# Patient Record
Sex: FEMALE | URBAN - METROPOLITAN AREA
[De-identification: names, ages, dates, MRNs, and addresses within clinical notes are randomized per-mention and may not be internally consistent; named-entity substitution may affect disease eponyms.]

---

## 2018-11-12 ENCOUNTER — APPOINTMENT (RX ONLY)
Dept: URBAN - METROPOLITAN AREA OTHER 10 | Facility: OTHER | Age: 48
Setting detail: DERMATOLOGY
End: 2018-11-12

## 2018-11-12 DIAGNOSIS — L20.89 OTHER ATOPIC DERMATITIS: ICD-10-CM

## 2018-11-12 DIAGNOSIS — B35.1 TINEA UNGUIUM: ICD-10-CM

## 2018-11-12 PROBLEM — L85.3 XEROSIS CUTIS: Status: ACTIVE | Noted: 2018-11-12

## 2018-11-12 PROBLEM — D23.61 OTHER BENIGN NEOPLASM OF SKIN OF RIGHT UPPER LIMB, INCLUDING SHOULDER: Status: ACTIVE | Noted: 2018-11-12

## 2018-11-12 PROBLEM — L20.84 INTRINSIC (ALLERGIC) ECZEMA: Status: ACTIVE | Noted: 2018-11-12

## 2018-11-12 PROBLEM — J45.909 UNSPECIFIED ASTHMA, UNCOMPLICATED: Status: ACTIVE | Noted: 2018-11-12

## 2018-11-12 PROCEDURE — ? PRESCRIPTION

## 2018-11-12 PROCEDURE — ? TREATMENT REGIMEN

## 2018-11-12 PROCEDURE — 99213 OFFICE O/P EST LOW 20 MIN: CPT

## 2018-11-12 PROCEDURE — ? COUNSELING

## 2018-11-12 RX ORDER — PROTECTIVES COMBINATION NO.2
CREAM (GRAM) TOPICAL
Qty: 90 | Refills: 0 | Status: ERX | COMMUNITY
Start: 2018-11-12

## 2018-11-12 RX ORDER — EFINACONAZOLE 100 MG/ML
SOLUTION TOPICAL DAILY
Qty: 4 | Refills: 4 | Status: ERX | COMMUNITY
Start: 2018-11-12

## 2018-11-12 RX ORDER — CLOBETASOL PROPIONATE 0.5 MG/G
CREAM TOPICAL BID
Qty: 60 | Refills: 0 | Status: ERX | COMMUNITY
Start: 2018-11-12

## 2018-11-12 RX ADMIN — EFINACONAZOLE: 100 SOLUTION TOPICAL at 00:00

## 2018-11-12 RX ADMIN — Medication: at 00:00

## 2018-11-12 RX ADMIN — CLOBETASOL PROPIONATE: 0.5 CREAM TOPICAL at 00:00

## 2018-11-12 ASSESSMENT — LOCATION DETAILED DESCRIPTION DERM
LOCATION DETAILED: LEFT GREAT TOENAIL
LOCATION DETAILED: DORSAL INTERPHALANGEAL JOINT LEFT THUMB
LOCATION DETAILED: LEFT PROXIMAL DORSAL SMALL FINGER
LOCATION DETAILED: RIGHT PROXIMAL DORSAL MIDDLE FINGER
LOCATION DETAILED: RIGHT PROXIMAL DORSAL INDEX FINGER
LOCATION DETAILED: RIGHT PROXIMAL DORSAL THUMB
LOCATION DETAILED: RIGHT PROXIMAL DORSAL SMALL FINGER
LOCATION DETAILED: RIGHT MEDIAL DORSAL FOOT
LOCATION DETAILED: RIGHT PROXIMAL DORSAL RING FINGER
LOCATION DETAILED: LEFT PROXIMAL DORSAL RING FINGER
LOCATION DETAILED: LEFT PROXIMAL DORSAL MIDDLE FINGER
LOCATION DETAILED: RIGHT GREAT TOENAIL
LOCATION DETAILED: LEFT PROXIMAL DORSAL INDEX FINGER

## 2018-11-12 ASSESSMENT — LOCATION SIMPLE DESCRIPTION DERM
LOCATION SIMPLE: LEFT THUMB
LOCATION SIMPLE: LEFT SMALL FINGER
LOCATION SIMPLE: RIGHT RING FINGER
LOCATION SIMPLE: LEFT GREAT TOE
LOCATION SIMPLE: RIGHT THUMB
LOCATION SIMPLE: LEFT INDEX FINGER
LOCATION SIMPLE: RIGHT MIDDLE FINGER
LOCATION SIMPLE: LEFT RING FINGER
LOCATION SIMPLE: RIGHT GREAT TOE
LOCATION SIMPLE: RIGHT INDEX FINGER
LOCATION SIMPLE: RIGHT FOOT
LOCATION SIMPLE: RIGHT SMALL FINGER
LOCATION SIMPLE: LEFT MIDDLE FINGER

## 2018-11-12 ASSESSMENT — LOCATION ZONE DERM
LOCATION ZONE: FINGER
LOCATION ZONE: TOENAIL
LOCATION ZONE: FEET

## 2018-11-12 ASSESSMENT — SEVERITY ASSESSMENT: SEVERITY: MILD TO MODERATE

## 2018-11-12 NOTE — PROCEDURE: TREATMENT REGIMEN
Initiate Treatment: Clobetasol Apply to affected areas on the hands twice daily x 2 weeks then Discontinue to only as needed for flares apply moisturizing cream on top\\nTetrix apply to hands 2-3 times daily
Detail Level: Simple
Otc Regimen: Begin moisturizing hands multiple times a day with a non-fragrance moisturizing cream
Initiate Treatment: Jublia Apply to affected areas on the hands twice daily x 2 weeks then Discontinue to only as needed for flares apply moisturizing cream on top

## 2018-12-05 ENCOUNTER — APPOINTMENT (RX ONLY)
Dept: URBAN - METROPOLITAN AREA OTHER 10 | Facility: OTHER | Age: 48
Setting detail: DERMATOLOGY
End: 2018-12-05

## 2018-12-05 DIAGNOSIS — L20.89 OTHER ATOPIC DERMATITIS: ICD-10-CM | Status: IMPROVED

## 2018-12-05 PROBLEM — L20.84 INTRINSIC (ALLERGIC) ECZEMA: Status: ACTIVE | Noted: 2018-12-05

## 2018-12-05 PROCEDURE — ? TREATMENT REGIMEN

## 2018-12-05 PROCEDURE — ? COUNSELING

## 2018-12-05 PROCEDURE — 99213 OFFICE O/P EST LOW 20 MIN: CPT

## 2018-12-05 ASSESSMENT — LOCATION ZONE DERM
LOCATION ZONE: FINGER
LOCATION ZONE: FEET
LOCATION ZONE: HAND

## 2018-12-05 ASSESSMENT — LOCATION DETAILED DESCRIPTION DERM
LOCATION DETAILED: DORSAL INTERPHALANGEAL JOINT RIGHT THUMB
LOCATION DETAILED: RIGHT MEDIAL DORSAL FOOT
LOCATION DETAILED: LEFT DORSAL MIDDLE FINGER METACARPOPHALANGEAL JOINT

## 2018-12-05 ASSESSMENT — SEVERITY ASSESSMENT: SEVERITY: ALMOST CLEAR

## 2018-12-05 ASSESSMENT — LOCATION SIMPLE DESCRIPTION DERM
LOCATION SIMPLE: LEFT HAND
LOCATION SIMPLE: RIGHT FOOT
LOCATION SIMPLE: RIGHT THUMB

## 2018-12-05 NOTE — PROCEDURE: TREATMENT REGIMEN
Continue Regimen: Clobetasol apply to affected areas of the body twice daily for two weeks only as needed for flares then Decrease to only on weekends for maintenance
Plan: Pt states hand  at work broke her hands out and she has now gotten new hand  that seems to not be irritating her
Detail Level: Simple
Otc Regimen: Continue moisturizing twice daily

## 2020-06-10 ENCOUNTER — OFFICE VISIT (OUTPATIENT)
Dept: URBAN - METROPOLITAN AREA TELEHEALTH 2 | Facility: TELEHEALTH | Age: 50
End: 2020-06-10

## 2020-06-19 ENCOUNTER — OFFICE VISIT (OUTPATIENT)
Dept: URBAN - METROPOLITAN AREA TELEHEALTH 2 | Facility: TELEHEALTH | Age: 50
End: 2020-06-19
Payer: COMMERCIAL

## 2020-06-19 DIAGNOSIS — K59.09 OTHER CONSTIPATION: ICD-10-CM

## 2020-06-19 DIAGNOSIS — R10.84 ABDOMINAL PAIN, RLQ: ICD-10-CM

## 2020-06-19 DIAGNOSIS — K42.9 UMBILICAL HERNIA: ICD-10-CM

## 2020-06-19 PROCEDURE — G8417 CALC BMI ABV UP PARAM F/U: HCPCS | Performed by: INTERNAL MEDICINE

## 2020-06-19 PROCEDURE — 99214 OFFICE O/P EST MOD 30 MIN: CPT | Performed by: INTERNAL MEDICINE

## 2020-06-19 PROCEDURE — 1036F TOBACCO NON-USER: CPT | Performed by: INTERNAL MEDICINE

## 2020-06-19 PROCEDURE — G9903 PT SCRN TBCO ID AS NON USER: HCPCS | Performed by: INTERNAL MEDICINE

## 2020-06-19 PROCEDURE — G8427 DOCREV CUR MEDS BY ELIG CLIN: HCPCS | Performed by: INTERNAL MEDICINE

## 2020-06-19 RX ORDER — PANTOPRAZOLE SODIUM 40 MG/1
1 TABLET TABLET, DELAYED RELEASE ORAL ONCE A DAY
Status: ACTIVE | COMMUNITY

## 2020-06-19 RX ORDER — SODIUM, POTASSIUM,MAG SULFATES 17.5-3.13G
354 ML SOLUTION, RECONSTITUTED, ORAL ORAL
Qty: 1 | Refills: 0 | OUTPATIENT

## 2020-06-19 NOTE — HPI-OTHER HISTORIES
The patient states that she started a keto diet a few months ago. She has been eating a diet high in nuts and low in carbohydrates. She soon started noticing intermittent bouts of increased gas production, with a "lump around her umbilical area." She would normally get some relief from PeptoBismol, until 6/5 where she developed "20/10" intensity abdominal pain. She had a CT done at [facility] as detailed below. She was discharged from the hospital on Pantoprazole QD and Sucralfate QID. She has noticed marked improvement but about 3 days ago she drank coffee, and soon after noticed an epigastric burning sensation.  She admits to chronic constipation for years. She will take a stool softener or colon cleanse as needed. She has noticed some mild worsening in her constipation with slightly more difficult BM's.  She otherwise denies nausea or vomiting. Appetite has been good. Her weight has remained relatively stable. No blood in the stool. She admits to increased stress as she lost her job due to the COVID pandemic. Her insurance runs out towards the end of July.  She has a remote history of H pylori infection. She was not able to complete the treatment. Eradication was never confirmed. She is ALLERGIC TO AMOXICILLIN, but this is new for her (hence it is possible that she was treated with Amoxi in the past for her H pylori infection).  She had a sleeve gastrectomy in 2016 with no complications. She had some insulin resistance but since making changes to her diet, this has resolved. She suffers from vertigo and asthma. She had her GB removed at the time of the gastgrectomy.  Her mother has has had colon polyps and breast cancer, her sister had throat cancer. There is no FH of colon cancer.  She is originally from [facility]. Summary of prior workup: - CT abdomen and pelvis with IV contrast on 6/5/20 revealed a normal liver, spleen, pancreas, bile ducts, kidneys and collecting systems. Adrenal glands were unremarkable. Status post cholecystectomy. Status post sleeve gastrectomy. Normal appendix. No focal inflammatory changes. Fat containing umbilical hernia. No adenopathy.

## 2020-07-08 ENCOUNTER — OFFICE VISIT (OUTPATIENT)
Dept: URBAN - METROPOLITAN AREA SURGERY CENTER 15 | Facility: SURGERY CENTER | Age: 50
End: 2020-07-08
Payer: COMMERCIAL

## 2020-07-08 DIAGNOSIS — K29.60 ADENOPAPILLOMATOSIS GASTRICA: ICD-10-CM

## 2020-07-08 DIAGNOSIS — K59.09 CHRONIC CONSTIPATION: ICD-10-CM

## 2020-07-08 DIAGNOSIS — K44.9 DIAPHRAGMATIC HERNIA: ICD-10-CM

## 2020-07-08 DIAGNOSIS — D12.0 BENIGN NEOPLASM OF CECUM: ICD-10-CM

## 2020-07-08 DIAGNOSIS — R10.84 ABDOMINAL CRAMPING, GENERALIZED: ICD-10-CM

## 2020-07-08 DIAGNOSIS — K63.89 BACTERIAL OVERGROWTH SYNDROME: ICD-10-CM

## 2020-07-08 PROCEDURE — G8907 PT DOC NO EVENTS ON DISCHARG: HCPCS | Performed by: INTERNAL MEDICINE

## 2020-07-08 PROCEDURE — 45385 COLONOSCOPY W/LESION REMOVAL: CPT | Performed by: INTERNAL MEDICINE

## 2020-07-08 PROCEDURE — G9937 DIG OR SURV COLSCO: HCPCS | Performed by: INTERNAL MEDICINE

## 2020-07-08 PROCEDURE — 43239 EGD BIOPSY SINGLE/MULTIPLE: CPT | Performed by: INTERNAL MEDICINE

## 2020-07-08 RX ORDER — PANTOPRAZOLE SODIUM 40 MG/1
1 TABLET TABLET, DELAYED RELEASE ORAL ONCE A DAY
Status: ACTIVE | COMMUNITY

## 2020-07-08 RX ORDER — SODIUM, POTASSIUM,MAG SULFATES 17.5-3.13G
354 ML SOLUTION, RECONSTITUTED, ORAL ORAL
Qty: 1 | Refills: 0 | Status: ACTIVE | COMMUNITY

## 2020-07-09 ENCOUNTER — TELEPHONE ENCOUNTER (OUTPATIENT)
Dept: URBAN - METROPOLITAN AREA CLINIC 78 | Facility: CLINIC | Age: 50
End: 2020-07-09

## 2020-07-09 RX ORDER — SUCRALFATE 1 G/1
1 TABLET ON AN EMPTY STOMACH TABLET ORAL
Qty: 60 | Refills: 1 | OUTPATIENT

## 2020-07-09 RX ORDER — PANTOPRAZOLE SODIUM 40 MG/1
1 TABLET TABLET, DELAYED RELEASE ORAL
Qty: 30 | Refills: 2 | OUTPATIENT

## 2020-07-24 ENCOUNTER — OFFICE VISIT (OUTPATIENT)
Dept: URBAN - METROPOLITAN AREA TELEHEALTH 2 | Facility: TELEHEALTH | Age: 50
End: 2020-07-24
Payer: COMMERCIAL

## 2020-07-24 DIAGNOSIS — R10.9 ABDOMINAL PAIN: ICD-10-CM

## 2020-07-24 DIAGNOSIS — R10.84 ABDOMINAL CRAMPING, GENERALIZED: ICD-10-CM

## 2020-07-24 DIAGNOSIS — R14.0 BLOATING: ICD-10-CM

## 2020-07-24 DIAGNOSIS — Z86.19 H/O HELICOBACTER INFECTION: ICD-10-CM

## 2020-07-24 DIAGNOSIS — K42.9 UMBILICAL HERNIA: ICD-10-CM

## 2020-07-24 DIAGNOSIS — K59.09 CHRONIC CONSTIPATION: ICD-10-CM

## 2020-07-24 DIAGNOSIS — K63.5 COLON POLYPS: ICD-10-CM

## 2020-07-24 DIAGNOSIS — K59.00 CONSTIPATION, UNSPECIFIED CONSTIPATION TYPE: ICD-10-CM

## 2020-07-24 PROCEDURE — 99214 OFFICE O/P EST MOD 30 MIN: CPT | Performed by: INTERNAL MEDICINE

## 2020-07-24 PROCEDURE — G8427 DOCREV CUR MEDS BY ELIG CLIN: HCPCS | Performed by: INTERNAL MEDICINE

## 2020-07-24 PROCEDURE — G8417 CALC BMI ABV UP PARAM F/U: HCPCS | Performed by: INTERNAL MEDICINE

## 2020-07-24 PROCEDURE — G9903 PT SCRN TBCO ID AS NON USER: HCPCS | Performed by: INTERNAL MEDICINE

## 2020-07-24 PROCEDURE — 1036F TOBACCO NON-USER: CPT | Performed by: INTERNAL MEDICINE

## 2020-07-24 RX ORDER — PANTOPRAZOLE SODIUM 40 MG/1
1 TABLET TABLET, DELAYED RELEASE ORAL
Qty: 30 | Refills: 2 | COMMUNITY

## 2020-07-24 RX ORDER — PANTOPRAZOLE SODIUM 40 MG/1
1 TABLET TABLET, DELAYED RELEASE ORAL ONCE A DAY
COMMUNITY

## 2020-07-24 RX ORDER — SODIUM, POTASSIUM,MAG SULFATES 17.5-3.13G
354 ML SOLUTION, RECONSTITUTED, ORAL ORAL
Qty: 1 | Refills: 0 | COMMUNITY

## 2020-07-24 RX ORDER — SUCRALFATE 1 G/1
1 TABLET ON AN EMPTY STOMACH TABLET ORAL
Qty: 60 | Refills: 1 | COMMUNITY

## 2020-07-24 NOTE — HPI-OTHER HISTORIES
The patient had initially seen me after she started noticing intermittent bouts of increased gas production, with a "lump around her umbilical area." She would normally get some relief from PeptoBismol, until 6/5 where she developed "20/10" intensity abdominal pain. She had a CT done at [facility] as detailed below. She was discharged from the hospital on Pantoprazole QD and Sucralfate QID. Although while on these meds she noticed an improvement, she was still having an epigastric burning sensation.  She admits to chronic constipation for years. She will take a stool softener or colon cleanse as needed. She has noticed some mild worsening in her constipation with slightly more difficult BM's. I had recommended she try Miralax and Benefiber and she felt that these were quite helpfulm, but she then stopped using them as "she did not feel she needed them anymore." She has noted a bit more bloated lately.  She is feeling better. She has not had further instances of the pain or the burning epigastric sensation. She has now been using the PPI when she 'remembers.' She otherwise denies nausea or vomiting. Appetite has been good. Her weight has remained relatively stable. No blood in the stool. She admits to increased stress as she lost her job due to the COVID pandemic.   She has a remote history of H pylori infection. She was not able to complete the treatment. Eradication was never confirmed. She is ALLERGIC TO AMOXICILLIN, but this is new for her (hence it is possible that she was treated with Amoxi in the past for her H pylori infection). Today we reviewed the results of her recent E/C and path.   She had a sleeve gastrectomy in 2016 with no complications. She had some insulin resistance but since making changes to her diet, this has resolved. She suffers from vertigo and asthma. She had her GB removed at the time of the gastgrectomy.  Her mother has has had colon polyps and breast cancer, her sister had throat cancer. There is no FH of colon cancer.  She is originally from [facility]. Summary of prior workup: - E/C by me on 7/8/20: Nomral upper GI tract except for mild antral erythema, a 3cm HH and sleeve gastrectomy anatomy. In the colon, there was a 5mm cecal TA, mil didverticulosis, melanosis coli and IH's. The quality of the prep was good. A repeat colonoscopy was advised in 5 years.  - CT abdomen and pelvis with IV contrast on 6/5/20 revealed a normal liver, spleen, pancreas, bile ducts, kidneys and collecting systems. Adrenal glands were unremarkable. Status post cholecystectomy. Status post sleeve gastrectomy. Normal appendix. No focal inflammatory changes. Fat containing umbilical hernia. No adenopathy.

## 2020-07-29 ENCOUNTER — DASHBOARD ENCOUNTERS (OUTPATIENT)
Age: 50
End: 2020-07-29

## 2021-05-18 NOTE — PHYSICAL EXAM GASTROINTESTINAL
Self Exam: Abdomen soft, non-tender to palpatation, non-distended Simple: Patient demonstrates quick and easy understanding/Patient asked questions/Verbalized Understanding